# Patient Record
Sex: FEMALE | Race: WHITE | NOT HISPANIC OR LATINO | Employment: OTHER | URBAN - METROPOLITAN AREA
[De-identification: names, ages, dates, MRNs, and addresses within clinical notes are randomized per-mention and may not be internally consistent; named-entity substitution may affect disease eponyms.]

---

## 2019-11-11 ENCOUNTER — TRANSFERRED RECORDS (OUTPATIENT)
Dept: HEALTH INFORMATION MANAGEMENT | Facility: CLINIC | Age: 54
End: 2019-11-11

## 2020-02-19 ENCOUNTER — TRANSFERRED RECORDS (OUTPATIENT)
Dept: HEALTH INFORMATION MANAGEMENT | Facility: CLINIC | Age: 55
End: 2020-02-19

## 2020-08-05 ENCOUNTER — TRANSFERRED RECORDS (OUTPATIENT)
Dept: HEALTH INFORMATION MANAGEMENT | Facility: CLINIC | Age: 55
End: 2020-08-05

## 2020-09-08 ENCOUNTER — MEDICAL CORRESPONDENCE (OUTPATIENT)
Dept: HEALTH INFORMATION MANAGEMENT | Facility: CLINIC | Age: 55
End: 2020-09-08

## 2020-09-08 ENCOUNTER — TRANSFERRED RECORDS (OUTPATIENT)
Dept: HEALTH INFORMATION MANAGEMENT | Facility: CLINIC | Age: 55
End: 2020-09-08

## 2020-09-09 ENCOUNTER — TRANSFERRED RECORDS (OUTPATIENT)
Dept: HEALTH INFORMATION MANAGEMENT | Facility: CLINIC | Age: 55
End: 2020-09-09

## 2020-10-28 ENCOUNTER — TRANSFERRED RECORDS (OUTPATIENT)
Dept: HEALTH INFORMATION MANAGEMENT | Facility: CLINIC | Age: 55
End: 2020-10-28

## 2020-10-30 ENCOUNTER — MEDICAL CORRESPONDENCE (OUTPATIENT)
Dept: HEALTH INFORMATION MANAGEMENT | Facility: CLINIC | Age: 55
End: 2020-10-30

## 2020-10-30 ENCOUNTER — REFERRAL (OUTPATIENT)
Dept: TRANSPLANT | Facility: CLINIC | Age: 55
End: 2020-10-30

## 2020-10-30 NOTE — LETTER
November 16, 2020      Lizeth Goetz  [address] [facility] [address]         Dear Lizeth,       You have recently expressed interest in our Solid Organ Transplant Program and have requested to begin the evaluation process.  We have made several attempts to get in touch with you but have been unable to reach you.    If you are still interested and/or have any questions, please contact us at  [phone] or [phone]   Monday - Friday, between the hours of 8:30am and 5:00pm central time.    We look forward to hearing from you.      Regards,     Solid Organ Transplant Intake   [facility]'[address]  [address], [facility] [address]  [place] [address]  November 16, 2020

## 2022-06-23 ENCOUNTER — TRANSFERRED RECORDS (OUTPATIENT)
Dept: HEALTH INFORMATION MANAGEMENT | Facility: CLINIC | Age: 57
End: 2022-06-23

## 2022-06-23 ENCOUNTER — MEDICAL CORRESPONDENCE (OUTPATIENT)
Dept: HEALTH INFORMATION MANAGEMENT | Facility: CLINIC | Age: 57
End: 2022-06-23

## 2022-06-27 ENCOUNTER — REFERRAL (OUTPATIENT)
Dept: TRANSPLANT | Facility: CLINIC | Age: 57
End: 2022-06-27

## 2022-06-27 DIAGNOSIS — N18.6 ESRD (END STAGE RENAL DISEASE) (H): Primary | ICD-10-CM

## 2022-06-27 DIAGNOSIS — Z01.818 PRE-TRANSPLANT EVALUATION FOR KIDNEY TRANSPLANT: ICD-10-CM

## 2022-06-27 DIAGNOSIS — I25.10 CARDIOVASCULAR DISEASE: ICD-10-CM

## 2022-06-27 DIAGNOSIS — Q60.2 CONGENITAL RENAL AGENESIS AND DYSGENESIS: ICD-10-CM

## 2022-06-27 DIAGNOSIS — I10 ESSENTIAL HYPERTENSION: ICD-10-CM

## 2022-06-27 DIAGNOSIS — N18.5 CHRONIC KIDNEY DISEASE, STAGE V (H): ICD-10-CM

## 2022-06-27 DIAGNOSIS — Q60.5 CONGENITAL RENAL AGENESIS AND DYSGENESIS: ICD-10-CM

## 2022-06-27 NOTE — LETTER
Lizeth Goetz  [address] [facility] [address]                July 6, 2022                                                                                        MEDICAL RECORDS REQUEST    [facility] Kid[facility], Kidney Pancreas Transplant Program Records Request                      Facility: [facility]    Thank you for referring your patient to the [facility] Kidney, Kidney Pancreas Program, in order to process the referral we will need the following information;      1. 2728 form   2. Immunizations records  3. Providers Progress notes, (last 3 note)      Please call our office at [phone] if you have any questions or concerns.                Please fax all paper records to [phone] within 3-5 business days.      Thank you,   The SOT Referral Intake Team     [facility]  Solid Organ Transplant Office  [address] [place] [address], [address] [address]

## 2022-06-27 NOTE — LETTER
July 26, 2022      Lizeth Goetz  [address]  [facility] [address]      Dear Lizeth,    Thank you for your interest in the Transplant Center at [facility]. We look forward to being a part of your care team and assisting you through the transplant process.    As we discussed, your transplant coordinator is Danni Lester (Kidney).  You may call your coordinator at any time with questions or concerns.  [phone].    Please complete the following.    1. Fill out and return the enclosed forms    Authorization for Electronic Communication    Authorization to Discuss Protected Health Information    Authorization for Release of Protected Health Information    2. Sign up for:    FlatStackt, access to your electronic medical record (see enclosed pamphlet)    [email or web address] [place], a transplant education website    You can use these tools to learn more about your transplant, communicate with your care team, and track your medical details      Sincerely,      Solid Organ Transplant  [facility]    cc: Referring Physician PCP

## 2022-07-06 VITALS — BODY MASS INDEX: 16.24 KG/M2 | WEIGHT: 86 LBS | HEIGHT: 61 IN

## 2022-07-06 NOTE — TELEPHONE ENCOUNTER
PCP: Shane Saleem   Referring Provider: Rajat Archuleta  Referring Diagnosis: ESRD    Is patient under the age of 65? yes  Is patient diabetic? no  Is patient on insulin? no  Was patient offered a pancreas transplant referral? No    NOTES:  BMI 16.3    Is patient in a group home/assisted living? no  Does patient have a guardian? no    Referral intake process completed.  Patient is aware that after financial approval is received, medical records will be requested.   Patient confirmed for a callback from transplant coordinator on July 18, 2022. (within 2 weeks)  Tentative evaluation date August 8, 2022 (within 4 weeks) if appointment is virtual, does patient have capabilities of setting this up?     Confirmed coordinator will discuss evaluation process in more detail at the time of their call.   Patient is aware of the need to arrange age appropriate cancer screening, vaccinations, and dental care.  Reminded patient to complete questionnaire, complete medical records release, and review packet prior to evaluation visit .  Assessed patient for special needs (ie-wheelchair, assistance, guardian, and ):  none   Patient instructed to call [phone] with questions.     Patient gave verbal consent during intake call to obtain medical records and documents outside of [facility]:  yes

## 2022-07-17 ENCOUNTER — HEALTH MAINTENANCE LETTER (OUTPATIENT)
Age: 57
End: 2022-07-17

## 2022-07-19 NOTE — TELEPHONE ENCOUNTER
"Reviewed pt's chart for pre-kidney transplant evaluation planning. Pt lives in [facility]. Referred to our center by Dr. Archuleta. Pt has ESRD due to single kidney, hyperfiltration and/or hypertensive nephrosclerosis. Patient reports that she had a surgery on her ureter as a child in [place], but does not remember when. Biopsy not completed. Pt is on peritoneal dialysis. Pt is not diabetic. Other hx includes Hep C (treated), carotid artery stenosis, bipolar disorder.  Heart hx: hypertension.  Lung status: referred to pulmonary in 2019 for SOB but did not complete PFT\"s or schedule appointment. Surgical hx includes as above, tubal ligation, PD cath placement.  BMI 19 on 3/16/22.  Discussed BMI requirement for transplant with patient: yes. Needs a colonoscopy. Has dentures..  Last Pap 5/2019. NIL.  Mammogram: 8/30/21 negative.  Pt is a current everyday smoker, does not consume alcohol, and does not use recreational drugs. Pt is independent w/ ADLs and has support following transplant.     I also introduced [facility] and asked pt to create an account and view pre-kidney transplant videos for review with me following evaluation. Confirmed STD 8/8.   "

## 2022-08-01 ENCOUNTER — TELEPHONE (OUTPATIENT)
Dept: TRANSPLANT | Facility: CLINIC | Age: 57
End: 2022-08-01

## 2022-08-01 NOTE — TELEPHONE ENCOUNTER
Spoke with patient. Confirmed upcoming PKE appointments at [facility] on 8/8/22 starting at 0730. Instructed patient may eat breakfast, take regularly scheduled medications and be accompanied by 1 adult visitor.   Patient given contact information for any further questions/concerns/need to reschedule.

## 2022-08-04 NOTE — PROGRESS NOTES
[facility] Solid Organ Transplant  Outpatient MNT: Kidney Transplant Evaluation    Current BMI: 17.48 (HT 61 in, WT 92.5 lbs / 42 kg)  BMI is within recommendation of <35 for kidney transplant  -Discussed underweight BMI with pt, and discussed strategies for weight gain during our meeting. BMI of 18.5 for this pt would be ~98 lbs.    Frailty Assessment -- 0, not frail     Recommended Interventions to Address Frailty:  None at this time, but recommended pt pursue weight gain (pending surgeons discretion).      Time Spent: 15 minutes  Visit Type: Initial   Referring Physician: Felicia Bolaños PA-C  Pt accompanied by: Daughter    History of previous txp: none   Dialysis: yes    Dialysis Info: PD every night.   Protein supplement: takes Nepro, every other day.     Nutrition Assessment  -Pt tries to avoid high salt foods, but did not state she follows a low sodium diet necessarily.     - Appetite: chronically low appetite.   - Food allergies/intolerances: Eggs cause upset stomach.   - Living situation: Lives alone in an apartment.   - Meal prep & grocery shopping: Pt.   - Previous RD education: For dialysis, gets labs drawn once a month.   - Issues chewing or swallowing: No.   - N/V/D/C: No.   - Food access concerns: No.     Vitamins, Supplements, Pertinent Meds: MVI.  Herbal Medicines/Supplements: No.     Edema: No.     Weight hx: No CBW, but pt states the 86 lbs weight on file is UBW for her.     Diet Recall  Breakfast Coffee w/ cream and sugar and donut in AM    Lunch [place] (tuna or lunch meat) or soup (beef barley)   Dinner Pork chop and sometimes mashed potatoes. Pt states it is variable but usually a serving of meat and a starch.    Snacks Hummus and chivo bread.    Beverages Lots of water, sprite on occasion (pt states does not drink often)   Alcohol No.    Dining out Once or twice a month (sit down restaurants).      Physical Activity  Walking the dog every day for about 10-15 minutes.       Labs  -Creatinine 9.16 (baseline for pt).  -BUN 43 (baseline for pt).   -GFR 4    Nutrition Diagnosis  Underweight related to small portion sizes and hypocaloric energy intake as evidenced by BMI of 17.48 and diet recall.     Nutrition Intervention  Nutrition education provided:  Discussed sodium intake (low sodium foods and drinks, seasoning food without salt and tips for low sodium diet).  Discussed strategies for weight gain, including increasing Nepro intake to once a day vs current use of Nepro every other day, using extra sauces / [place] on foods, eating meals consistently and incorporating snacks between these meals, and eating increased portion sizes when able.     Reviewed post txp diet guidelines in brief (will review in further detail post txp):  (1) Review of proper food safety measures d/t immunosuppressant therapy post-op and increased risk for food-borne illness    (2) Avoid the following post txp d/t risk for rejection, unknown effects on the organs, and/or potential interactions with immunosuppressants:  - Herbal, Chinese, holistic, chiropractic, natural, alternative medicines and supplements  - Detoxes and cleanses  - Weight loss pills  - Protein powders or other products with extracts or herbs (ie green tea extract)    (3) Med regimen and possible side effects    Patient Understanding: Pt verbalized understanding of education provided.  Expected Engagement: Good  Follow-Up Plans: PRN     Nutrition Goals  -Discussed weight gain, recommend pt reaches BMI > 18.5 (~98 lbs).     Mynor Wells RD, LD

## 2022-08-08 ENCOUNTER — ALLIED HEALTH/NURSE VISIT (OUTPATIENT)
Dept: TRANSPLANT | Facility: CLINIC | Age: 57
End: 2022-08-08
Attending: PHYSICIAN ASSISTANT
Payer: COMMERCIAL

## 2022-08-08 ENCOUNTER — LAB (OUTPATIENT)
Dept: LAB | Facility: CLINIC | Age: 57
End: 2022-08-08

## 2022-08-08 ENCOUNTER — DOCUMENTATION ONLY (OUTPATIENT)
Dept: TRANSPLANT | Facility: CLINIC | Age: 57
End: 2022-08-08

## 2022-08-08 VITALS
HEIGHT: 61 IN | OXYGEN SATURATION: 99 % | HEART RATE: 92 BPM | SYSTOLIC BLOOD PRESSURE: 192 MMHG | WEIGHT: 92.5 LBS | BODY MASS INDEX: 17.47 KG/M2 | DIASTOLIC BLOOD PRESSURE: 92 MMHG

## 2022-08-08 DIAGNOSIS — I10 ESSENTIAL HYPERTENSION: ICD-10-CM

## 2022-08-08 DIAGNOSIS — Z01.818 PRE-TRANSPLANT EVALUATION FOR KIDNEY TRANSPLANT: ICD-10-CM

## 2022-08-08 DIAGNOSIS — B18.2 CHRONIC HEPATITIS C WITHOUT HEPATIC COMA (H): ICD-10-CM

## 2022-08-08 DIAGNOSIS — I25.10 CARDIOVASCULAR DISEASE: ICD-10-CM

## 2022-08-08 DIAGNOSIS — N18.5 CHRONIC KIDNEY DISEASE, STAGE V (H): ICD-10-CM

## 2022-08-08 DIAGNOSIS — Q60.5 CONGENITAL RENAL AGENESIS AND DYSGENESIS: ICD-10-CM

## 2022-08-08 DIAGNOSIS — N18.6 ESRD (END STAGE RENAL DISEASE) (H): Primary | ICD-10-CM

## 2022-08-08 DIAGNOSIS — Q60.2 CONGENITAL RENAL AGENESIS AND DYSGENESIS: ICD-10-CM

## 2022-08-08 DIAGNOSIS — I12.9 HYPERTENSION, RENAL: ICD-10-CM

## 2022-08-08 DIAGNOSIS — F14.11 COCAINE ABUSE IN REMISSION (H): ICD-10-CM

## 2022-08-08 DIAGNOSIS — I73.9 PAD (PERIPHERAL ARTERY DISEASE) (H): ICD-10-CM

## 2022-08-08 DIAGNOSIS — Z76.82 ORGAN TRANSPLANT CANDIDATE: ICD-10-CM

## 2022-08-08 DIAGNOSIS — N18.6 ESRD (END STAGE RENAL DISEASE) (H): ICD-10-CM

## 2022-08-08 LAB
A1 AB TITR SERPL: 32 {TITER}
A1 AB TITR SERPL: 8 {TITER}
A2 AB TITR SERPL: <1 {TITER}
A2 AB TITR SERPL: <1 {TITER}
ABO/RH(D): NORMAL
ALBUMIN SERPL-MCNC: 2.7 G/DL (ref 3.4–5)
ALBUMIN UR-MCNC: 100 MG/DL
ALP SERPL-CCNC: 67 U/L (ref 40–150)
ALT SERPL W P-5'-P-CCNC: 11 U/L (ref 0–50)
ANION GAP SERPL CALCULATED.3IONS-SCNC: 9 MMOL/L (ref 3–14)
ANTIBODY SCREEN: NEGATIVE
ANTIBODY TITER IGM SCREEN: NEGATIVE
APPEARANCE UR: CLEAR
APTT PPP: 25 SECONDS (ref 22–38)
AST SERPL W P-5'-P-CCNC: 14 U/L (ref 0–45)
BASOPHILS # BLD AUTO: 0.1 10E3/UL (ref 0–0.2)
BASOPHILS NFR BLD AUTO: 1 %
BILIRUB SERPL-MCNC: 0.3 MG/DL (ref 0.2–1.3)
BILIRUB UR QL STRIP: NEGATIVE
BUN SERPL-MCNC: 43 MG/DL (ref 7–30)
CALCIUM SERPL-MCNC: 8.7 MG/DL (ref 8.5–10.1)
CHLORIDE BLD-SCNC: 96 MMOL/L (ref 94–109)
CO2 SERPL-SCNC: 29 MMOL/L (ref 20–32)
COLOR UR AUTO: YELLOW
CREAT SERPL-MCNC: 9.46 MG/DL (ref 0.52–1.04)
EOSINOPHIL # BLD AUTO: 0.2 10E3/UL (ref 0–0.7)
EOSINOPHIL NFR BLD AUTO: 1 %
ERYTHROCYTE [DISTWIDTH] IN BLOOD BY AUTOMATED COUNT: 14.7 % (ref 10–15)
FACTOR 2 INTERPRETATION: NORMAL
FACTOR V INTERPRETATION: NORMAL
GFR SERPL CREATININE-BSD FRML MDRD: 4 ML/MIN/1.73M2
GLUCOSE BLD-MCNC: 100 MG/DL (ref 70–99)
GLUCOSE UR STRIP-MCNC: NEGATIVE MG/DL
HCT VFR BLD AUTO: 33.1 % (ref 35–47)
HGB BLD-MCNC: 10.9 G/DL (ref 11.7–15.7)
HGB UR QL STRIP: ABNORMAL
IMM GRANULOCYTES # BLD: 0 10E3/UL
IMM GRANULOCYTES NFR BLD: 0 %
INR PPP: 0.9 (ref 0.85–1.15)
KETONES UR STRIP-MCNC: NEGATIVE MG/DL
LAB DIRECTOR COMMENTS: NORMAL
LAB DIRECTOR DISCLAIMER: NORMAL
LAB DIRECTOR INTERPRETATION: NORMAL
LAB DIRECTOR METHODOLOGY: NORMAL
LAB DIRECTOR RESULTS: NORMAL
LEUKOCYTE ESTERASE UR QL STRIP: ABNORMAL
LYMPHOCYTES # BLD AUTO: 2.5 10E3/UL (ref 0.8–5.3)
LYMPHOCYTES NFR BLD AUTO: 23 %
MCH RBC QN AUTO: 28.4 PG (ref 26.5–33)
MCHC RBC AUTO-ENTMCNC: 32.9 G/DL (ref 31.5–36.5)
MCV RBC AUTO: 86 FL (ref 78–100)
MONOCYTES # BLD AUTO: 0.8 10E3/UL (ref 0–1.3)
MONOCYTES NFR BLD AUTO: 8 %
NEUTROPHILS # BLD AUTO: 7.5 10E3/UL (ref 1.6–8.3)
NEUTROPHILS NFR BLD AUTO: 67 %
NITRATE UR QL: NEGATIVE
NRBC # BLD AUTO: 0 10E3/UL
NRBC BLD AUTO-RTO: 0 /100
PH UR STRIP: 7.5 [PH] (ref 5–7)
PLATELET # BLD AUTO: 352 10E3/UL (ref 150–450)
POTASSIUM BLD-SCNC: 4 MMOL/L (ref 3.4–5.3)
PROT SERPL-MCNC: 6.8 G/DL (ref 6.8–8.8)
RBC # BLD AUTO: 3.84 10E6/UL (ref 3.8–5.2)
RBC URINE: 1 /HPF
SODIUM SERPL-SCNC: 134 MMOL/L (ref 133–144)
SP GR UR STRIP: 1.01 (ref 1–1.03)
SPECIMEN DESCRIPTION: NORMAL
SPECIMEN EXPIRATION DATE: NORMAL
SPECIMEN EXPIRATION DATE: NORMAL
SQUAMOUS EPITHELIAL: 9 /HPF
T PALLIDUM AB SER QL: NONREACTIVE
UROBILINOGEN UR STRIP-MCNC: NORMAL MG/DL
WBC # BLD AUTO: 11.1 10E3/UL (ref 4–11)
WBC URINE: 8 /HPF

## 2022-08-08 PROCEDURE — 86901 BLOOD TYPING SEROLOGIC RH(D): CPT

## 2022-08-08 PROCEDURE — 86706 HEP B SURFACE ANTIBODY: CPT

## 2022-08-08 PROCEDURE — 80053 COMPREHEN METABOLIC PANEL: CPT

## 2022-08-08 PROCEDURE — 86665 EPSTEIN-BARR CAPSID VCA: CPT

## 2022-08-08 PROCEDURE — 86481 TB AG RESPONSE T-CELL SUSP: CPT

## 2022-08-08 PROCEDURE — G0452 MOLECULAR PATHOLOGY INTERPR: HCPCS | Mod: 26 | Performed by: PATHOLOGY

## 2022-08-08 PROCEDURE — 85670 THROMBIN TIME PLASMA: CPT

## 2022-08-08 PROCEDURE — 86787 VARICELLA-ZOSTER ANTIBODY: CPT

## 2022-08-08 PROCEDURE — 86886 COOMBS TEST INDIRECT TITER: CPT

## 2022-08-08 PROCEDURE — 86147 CARDIOLIPIN ANTIBODY EA IG: CPT

## 2022-08-08 PROCEDURE — 86780 TREPONEMA PALLIDUM: CPT

## 2022-08-08 PROCEDURE — 80307 DRUG TEST PRSMV CHEM ANLYZR: CPT

## 2022-08-08 PROCEDURE — 81240 F2 GENE: CPT

## 2022-08-08 PROCEDURE — 81001 URINALYSIS AUTO W/SCOPE: CPT

## 2022-08-08 PROCEDURE — 85025 COMPLETE CBC W/AUTO DIFF WBC: CPT

## 2022-08-08 PROCEDURE — 86704 HEP B CORE ANTIBODY TOTAL: CPT

## 2022-08-08 PROCEDURE — 85610 PROTHROMBIN TIME: CPT

## 2022-08-08 PROCEDURE — 86644 CMV ANTIBODY: CPT

## 2022-08-08 PROCEDURE — 86803 HEPATITIS C AB TEST: CPT

## 2022-08-08 PROCEDURE — 85390 FIBRINOLYSINS SCREEN I&R: CPT | Mod: 26 | Performed by: PATHOLOGY

## 2022-08-08 PROCEDURE — 86833 HLA CLASS II HIGH DEFIN QUAL: CPT

## 2022-08-08 PROCEDURE — 85730 THROMBOPLASTIN TIME PARTIAL: CPT

## 2022-08-08 PROCEDURE — 86850 RBC ANTIBODY SCREEN: CPT

## 2022-08-08 PROCEDURE — 36415 COLL VENOUS BLD VENIPUNCTURE: CPT

## 2022-08-08 PROCEDURE — 99205 OFFICE O/P NEW HI 60 MIN: CPT

## 2022-08-08 PROCEDURE — 86832 HLA CLASS I HIGH DEFIN QUAL: CPT

## 2022-08-08 PROCEDURE — 81378 HLA I & II TYPING HR: CPT

## 2022-08-08 PROCEDURE — 99215 OFFICE O/P EST HI 40 MIN: CPT

## 2022-08-08 PROCEDURE — 99417 PROLNG OP E/M EACH 15 MIN: CPT

## 2022-08-08 PROCEDURE — 87340 HEPATITIS B SURFACE AG IA: CPT

## 2022-08-08 RX ORDER — ZOLPIDEM TARTRATE 5 MG/1
1 TABLET ORAL
COMMUNITY
Start: 2022-04-27

## 2022-08-08 RX ORDER — BISACODYL 5 MG/1
1 TABLET, COATED ORAL
COMMUNITY
Start: 2020-09-24

## 2022-08-08 RX ORDER — SEVELAMER CARBONATE 800 MG/1
1 TABLET, FILM COATED ORAL
COMMUNITY
Start: 2021-08-11

## 2022-08-08 RX ORDER — ASPIRIN 81 MG/1
1 TABLET, CHEWABLE ORAL
COMMUNITY
Start: 2021-07-29

## 2022-08-08 RX ORDER — BUSPIRONE HYDROCHLORIDE 15 MG/1
TABLET ORAL
COMMUNITY
Start: 2021-02-26

## 2022-08-08 RX ORDER — CLONIDINE HYDROCHLORIDE 0.1 MG/1
0.1 TABLET ORAL ONCE
Qty: 1 TABLET | Refills: 0 | Status: SHIPPED | OUTPATIENT
Start: 2022-08-08 | End: 2022-08-08

## 2022-08-08 RX ORDER — IPRATROPIUM BROMIDE AND ALBUTEROL SULFATE 2.5; .5 MG/3ML; MG/3ML
SOLUTION RESPIRATORY (INHALATION) EVERY 4 HOURS
COMMUNITY
Start: 2020-09-09

## 2022-08-08 RX ORDER — HYDRALAZINE HYDROCHLORIDE 25 MG/1
TABLET, FILM COATED ORAL
COMMUNITY
Start: 2021-04-22

## 2022-08-08 RX ORDER — ALBUTEROL SULFATE 90 UG/1
2 AEROSOL, METERED RESPIRATORY (INHALATION)
COMMUNITY
Start: 2020-09-09

## 2022-08-08 RX ORDER — LOSARTAN POTASSIUM 50 MG/1
TABLET ORAL
COMMUNITY
Start: 2021-07-29

## 2022-08-08 RX ORDER — CALCIUM ACETATE 667 MG/1
CAPSULE ORAL
COMMUNITY
Start: 2021-06-09

## 2022-08-08 RX ORDER — DILTIAZEM HYDROCHLORIDE 180 MG/1
CAPSULE, EXTENDED RELEASE ORAL
COMMUNITY
Start: 2020-09-09

## 2022-08-08 NOTE — PROGRESS NOTES
"Psychosocial Assessment  Patient Name/ Age: Lizeth Goetz 56 year old   Medical Record #: [id number]  Duration of Interview:     45   min  Process:   Face-to-Face Interview                (counseling < 50%)   Present at Appointment: Lizeth and her daughter Hollie        :MILLY Butt, [facility] Date:  August 8, 2022        Type of transplant: Kidney    Donor type:   Lizeth indicated her daughter and sister have expressed interest in being a donor.   [facility]   Prior Transplants:    No Status of Transplant:       Current Living Situation    Location:   [address] [place] [id number]  With Whom: lives alone       Family/ Social Support:    Hollie (37) lives in [place], [place].  Lizeth also has another daughter Gloria (33) who lives in [place] and a son Buck (39) who is incarcerated.  Lizeth has three sisters (2-[place], [place] and 1-[place], [place]).    Available, helpful   Committed relationship:   Single   Other supports:   Friends Available, helpful       Activities/ Functional Ability    Current level:  Lizeth wears corrective lenses. Ambulatory, visually impaired and independent with ADL's     Transportation Drives self       Vocational/Employment/Financial     Employment   Disabled   Job Description      Income  SSDI for 1.5 years due to COPD and ESRD.   [employer]   Insurance  [employer] [place]    At this time, patient can afford medication costs:  Yes  MA       Medical Status    Current mode of treatment for ESRD Dialysis   Complications - Non diabetic        Behavioral    Tobacco Use Yes  Chemical Dependency No   Lizeth indicated she is smoking half a pack of cigarettes a day and is working on quitting.   Lizeth indicated she quit drinking four years ago, no treatment required. She indicated she smokes marijuana \"couple of times\" a month to assist with appetite.     Psychiatric Impairment No  Lizeth indicated she is on anxiety medications daily which is of assistance.    Reading ability Good  Education Level: Some " College Recent Legal History No        Coping Style/Strategies: Lizeth indicated when under stress she will talk to her daughters or meditate.   Ability to Adhere to Complex Medical Regime: Yes     Adherence History:Lizeth indicated she will usually follow her physician's recommendations except for quitting smoking.        Education  _X_ Medicare  _X_ Rehabilitation  _X_ Donor issues  _X_ Community resources  _X_ Post discharge housing  _X_ Financial resources  _X_ Medical insurance options  _X_ Psych adjustment  _X_ Family adjustment  _X_ Health Care Directive - Yes, Will Provide   Psychosocial Risks of Transplant Reviewed and Discussed:  _X_ Increased stress related to emotional,            family, social, employment or financial           situation  _X_ Affect on work and/or disability benefits  _X_ Affect on future life insurance  _X_ Transplant outcome expectations may           not be met  _X_ Mental Health Risks: anxiety,           depression, PTSD, guilt, grief and           chronic fatigue     Notable Items:   In chart review it was noted Lizeth has a history of cocaine use, she denied any other drugs used beside marijuana.  Lizeth is not eligible for Medicare due to not having enough work credits.      Final Evaluation/Assessment   Patient seemed to process information well. Appeared well informed, motivated and able to follow post transplant requirements. Behavior was appropriate during interview. Has adequate income and insurance coverage. Adequate social support. No major contraindications noted for transplant.  At this time patient appears to understand the risks and benefits of transplant.      Recommendation  Acceptable    Selection Criteria Met:  Plan for support Yes   Chemical Dependence Yes   Smoking No  Mental Health Yes   Adequate Finances Yes    Signature: MILLY Butt, [facility]   Title: Clinical

## 2022-08-08 NOTE — PROGRESS NOTES
Kidney Transplant Referral - 6/27/2022  Patient attended appointments accompanied by daughter  Patient completed AM appointments with all PKE providers.  Time and location of PM appointments reviewed with patient.  Patient instructed next contact from Transplant Coordinator will be following Selection Committee  BP elevated.  Patient instructed to take regular dose Diltiazem  Repeat BP after 30 minutes and 45 minutes without significant change  Clonidine 10 mg ordered.  Patient did not return to clinic for BP recheck  KDPI form signed and faxed to HIM  Patient has not watched [facility] Pre Kidney Transplant videos 1&2.  Demonstrated accessing [facility] and watching Pre Kidney Transplant videos 1&2.      Summary    Team s concerns/comments:   1) Cardiac risk assessment  2) PAD assessment  3) COPD/tobacco use  4) Low BMI  5) Reflux nephrolapathy, Recurrent UTI  6) Poly substance abuse  7) Carotid stenosis  8) Anxiety/Depression  9) Labile blood pressure  10) HCV  11) Health maintenance    Candidacy category: Yellow    Action/Plan:   1) EKG today. Echocardiogram ordered  2) A/P CT, Iliac US, ABIs  3) Smoking cessation. PFTs. Low dose chest CT.  Pulmonary  4) BMI 17. Dietitian input  5) Urology consult  6) Urine drug screen ordered  7) Vascular surgery consult  8) SW input  9) F/U PCP  10) RNA pending  11) Due for colonoscopy, mammogram, Dental    Expected Selection Meeting Discussion: 8/17/22

## 2022-08-08 NOTE — LETTER
8/8/2022         RE: Lizeth Goetz  [address] [facility] [place] [address]        Dear Colleague,    Thank you for referring your patient, Lizeth Goetz, to the [facility]. Please see a copy of my visit note below.    TRANSPLANT NEPHROLOGY RECIPIENT EVALUATION NOTE    Assessment and Plan:  # Kidney Transplant Evaluation: Patient is a marginal candidate overall. Benefits of a living donor transplant were discussed.    # ESKD: likely multifactorial including congenital dsyplasia, reflux nephropathy, ALEKSEY, HTN and chronic hep c. Although doing OK on PD since 8/2020, she may benefit from a kidney transplant.     # Reflux nephropathy, Recurrent UTI:  Patient reports that she had a surgery on her ureter as a child with recurrent UTI most of her life, although has not been an  issue for past several years. Will need urology consult.     # HCV s/p treatment: patient reports completed treatment in late 2018 but was lost to follow up with GI. Has not had RNA checked post-treatment. Unremarkable fibroscan in 2018. Labs today included platelet count 353 K, INR and LFTS wnls, albumin 2.7. RNA pending. Will need hepatology clearance.     # H/o polysubstance use (cocaine, marijuana, alcohol): still smoking marijuana on a weekly basis. Urine drug screen pending.     # Tobacco Abuse, COPD: 30-pack-year. Still smoking 1/4 ppd. Will need low-dose CT chest and PFTs. Needs to quit smoking for transplant.     # Carotid disease: 70% stenosis on right and 50% stenosis on the left. Will await local vascular input.     # PAD screening: calcifications on outside 2020 CT. Will need updated CT with iliacs and ABIs per surgery.     # Cardiac Risk: Accelerated junctional rhythm on EKG, faxed to PCP and primary neph. Would need  coronary angiogram for cardiology clearance given vascular disease and smoking history.     # Labile blood pressures: SBP morning checks have been in the 70s as of late for which local neph d/c'd a few  antihypertensives. Presented with SBP >200 today. Took home afternoon dose of diltiazem. Clonidine also given. Will refer to local neph for management. Prior local work up has included undetectable renin levels and elevated aldosterone. Adrenal glands unremarkable on outside 2020 CT.     # Malnourished, BMI 17: albumin 2.7.     # Depression: poorly controlled. Possible bipolar mentioned in outside records. Appreciate social work input.      # Positive quant gold: needs ID.        # Health Maintenance: Colonoscopy: Not up to date, Mammogram: Not Up to date, 5/2010 PAP (NILM, HPV neg): Up to date and Dental: Not up to date    Discussed the risks and benefits of a transplant, including the risk of surgery and immunosuppression medications.  Patient's overall evaluation will be discussed in the Transplant Program's regular meeting with a final recommendation on the patients suitability for transplant to be made at that time.    Pending completion of the full evaluation, patient presently appears to be enough of an acceptable kidney transplant recipient candidate to have any potential kidney donors start the evaluation process.      Evaluation:  Lizeth Goetz was seen in consultation at the request of Dr. Marsha Griffith for evaluation as a potential kidney transplant recipient.    Reason for Visit:  Lizeth Goetz is a 56 year old female with ESKD liekly multifactorial, who presents for kidney transplant evaluation.    History of Present Illness:  Lizeth Goetz is a 56-year-old female with history of ESRD that is likely multifactorial including congenital dsyplasia, reflux nephropathy, ALEKSEY, HTN and chronic hep c.  No prior kidney biopsy. Patient reports that she had a surgery on her ureter as a child in [facility]. Recurrent UTIs most of her life. Diagnosed with HTN in 2015, since difficult to control.  Proteinuria since 2016 measuring 500 mg/dl on dip stick. CKD since 2018 with creatinines around 3.0. Had SUSHILA in 7/2020  with creatinine of 5.4 and which time she had a renal US that showed asymmetric kidneys. Ultimately started  PD in  8/2020 which she reports is going well.  No episodes of peritonitis.  When asked why she had a delay in coming in for transplant evaluation she states because she was unsure if she was a candidate and was not motivated to.           Kidney Disease Hx:        Kidney Disease Dx:  likely multifactorial including congenital dsyplasia, reflux nephropathy, ALEKSEY, HTN and chronic hep c.       Biopsy Proven: No         On Dialysis: Yes, Date initiated: 8/2020 and Dialysis Type: PD; still making some urine every day.        Primary Nephrologist: Dr. Archuleta       H/o Kidney Stones: No       H/o Recurrent/Frequent UTI: Yes          Diabetic Hx: None           Cardiac/Vascular Disease Risk Factors:        Cardiac Risk Factors: Hypertension, CKD, Peripheral Arterial Disease and Smoking       Known CAD: No       Known PAD/Caludication Symptoms: Yes; calcifications on 2020 CT       Known Heart Failure: No       Arrhythmia: No       Pulmonary Hypertension: No       Valvular Disease: No       Other: None         Viral Serology Status       CMV IgG Antibody: Unknown       EBV IgG Antibody: Unknown         Volume Status/Weight:        Volume status: Euvolemic       Weight:  BMI below guidelines       BMI: There is no height or weight on file to calculate BMI.         Functional Capacity/Frailty:        Lives in apartment alone. Walks her dog a few blocks a day with some dyspnea. Denies chest pains with stairs.     Fatigue/Decreased Energy: [x] No [] Yes    Chest Pain or SOB with Exertion: [] No [x] Yes Dyspnea with walking    Significant Weight Change: [x] No [] Yes    Nausea, Vomiting or Diarrhea: [x] No [] Yes    Fever, Sweats or Chills:  [x] No [] Yes    Leg Swelling [x] No [] Yes        History of Cancer: None    Other Significant Medical Issues:     # HTN: undetectable renin levels and elevated aldosterone.     # HCV  s/p treatment: patient reports completed treatment in late 2018 but was lost to follow up with GI. Had unremarkable fibroscan in 2018. Labs today included platelet count 353 K, INR and LFTS wnls, albumin 2.7    # H/o polysubstance use (cocaine, marijuana, alcohol): still smoking marijuana on a weekly basis.      # Depression: poorly controlled.     # Tobacco Abuse: 30-pack-year. Still smoking 1/4 ppd.     # Carotid disease: 70% stenosis on right and 50% stenosis on the left.      # Recurrent UTI: most of her life, but reports has not been an  issue for past several years.     # Labile blood pressures: SBP morning checks have been in the 70s for which local neph d/c'd a few  antihypertensives. Presented with SBP >200 today. Will refer to local neph for management.      # Malnourished, BMI 17: albumin 2.7.       Allergy Testing Questions:   Medication that caused a reaction None   Antibiotics used that didn't give an allergic reaction?  None    COVID Vaccination Up To Date: No    Potential Living Kidney Donors: sister, grand daughter, daughter    Review of Systems:  A comprehensive review of systems was obtained and negative, except as noted in the HPI or PMH.    Past Medical History:   Medical record was reviewed and PMH was discussed with patient and noted below.  Past Medical History:   Diagnosis Date     Hepatitis C 2019     Hypertension        Past Social History:   No past surgical history on file.  Personal history of bleeding or anesthesia problems: No    Family History:  No family history on file.    Personal History:   Social History     Socioeconomic History     Marital status: Single     Spouse name: Not on file     Number of children: Not on file     Years of education: Not on file     Highest education level: Not on file   Occupational History     Not on file   Tobacco Use     Smoking status: Current Every Day Smoker     Packs/day: 0.25     Types: Cigarettes     Smokeless tobacco: Never Used     Tobacco  "comment: \"in the process of quitting\"   Substance and Sexual Activity     Alcohol use: Not Currently     Drug use: Not Currently     Sexual activity: Not on file   Other Topics Concern     Not on file   Social History Narrative     Not on file     Social Determinants of Health     Financial Resource Strain: Not on file   Food Insecurity: Not on file   Transportation Needs: Not on file   Physical Activity: Not on file   Stress: Not on file   Social Connections: Not on file   Intimate Partner Violence: Not on file   Housing Stability: Not on file       Allergies:  Allergies   Allergen Reactions     Chicken-Derived Products (Egg) GI Disturbance     Codeine Nausea and Vomiting     Morphine Nausea and Vomiting       Medications:  Current Outpatient Medications   Medication Sig     albuterol (PROAIR HFA/PROVENTIL HFA/VENTOLIN HFA) 108 (90 Base) MCG/ACT inhaler Inhale 2 puffs into the lungs     aspirin (ASA) 81 MG chewable tablet Take 1 tablet by mouth     busPIRone (BUSPAR) 15 MG tablet      calcium acetate (PHOSLO) 667 MG CAPS capsule      diltiazem ER (DILT-XR) 180 MG 24 hr capsule      hydrALAZINE (APRESOLINE) 25 MG tablet      ipratropium - albuterol 0.5 mg/2.5 mg/3 mL (DUONEB) 0.5-2.5 (3) MG/3ML neb solution Inhale into the lungs every 4 hours     losartan (COZAAR) 50 MG tablet      Methoxy PEG-Epoetin Beta (MIRCERA IJ) Inject 50 mcg Subcutaneous     Multiple Vitamins-Minerals (PX COMPLETE SENIOR MULTIVITS) TABS Take 1 tablet by mouth     sevelamer carbonate (RENVELA) 800 MG tablet Take 1 tablet by mouth     zolpidem (AMBIEN) 5 MG tablet Take 1 tablet by mouth     No current facility-administered medications for this visit.       Vitals:  Breastfeeding No     Exam:  GENERAL APPEARANCE: alert and no distress  HENT: mouth without ulcers or lesions  LYMPHATICS: no cervical or supraclavicular nodes  RESP: lungs clear to auscultation - no rales, rhonchi or wheezes  CV: regular rhythm, normal rate, no rub, no " murmur  FEMORAL PULSES: palpated by surgery   EDEMA: no LE edema bilaterally  ABDOMEN: soft, nondistended, nontender, bowel sounds normal  MS: extremities normal - no gross deformities noted, no evidence of inflammation in joints, no muscle tenderness  SKIN: no rash    Results:   No results found for this or any previous visit (from the past 336 hour(s)).      Review of prior external note(s) from - [facility]where information from [facility] reviewed  I spent a total of 180 minutes on the day of the visit.   Time spent doing chart review, history and exam, documentation and further activities per the note        Again, thank you for allowing me to participate in the care of your patient.        Sincerely,        AMI

## 2022-08-08 NOTE — PROGRESS NOTES
Transplant Surgery Consult Note     Medical record number: [id number]  YOB: 1965,   Consult requested  for evaluation of kidney transplant candidacy.    Assessment and Recommendations:Ms. Goetz appears to be a fair candidate for kidney transplantation and has a good understanding of the risks and benefits of this approach to the management of renal failure. The following issues should be addressed prior to finalizing her transplant candidacy:     Transplant order: Ms. Goetz has End stage renal failure due to hypertension and single kidney whose condition is not expected to resolve, is expected to progress, and is expected to continue to develop related comorbid conditions.  Recommend he be considered as a candidate for kidney transplant.  Cardiology consult for cardiac risk stratification to be ordered: Yes. Likely will need cardiac cath due to history of smoking and calcifications seen on CT from 2019  CT abdomen and pelvis without contrast to be ordered for assessment of vascular targets: Yes  Transplant listing labs ordered to include HLA, ABOx2, Cr, etc.  Dietician consult ordered: Yes  Social work consult ordered: Yes  Imaging reports reviewed:  CT from 2/2020 showing atrophied of L kidney and no urolithiasis  Radiology images reviewed: B EIA and BRIDGET's suitable for transplant from CT from 2020 but there are some areas of plaque that may be flow limiting especially on the right side at the take off of the internal iliac artery. Duplex needed  Recipient suitable to move forward with work up of living donors:  No  Vascular surgery consult for 70% carotid artery stenosis  CT A/P non-con  Duplex of iliacs  Smoking cessation as a condition of participation due to her SOB, h/o COPD, and audible wheezing  PFT's due to SOB and long history of smoking.  Has been told she has COPD  Screening low dose chest CT due to history of smoking (smoking for 40 years)    The majority of our visit was spent in counselling,  discussing the medical and surgical risks of kidney transplantation. We discussed approximate wait time and how that is influenced by issues such as blood type and sensitization (PRA) and access to a living donor. I contrasted potential waiting time for living vs  donor kidneys from  normal (0-85%) or higher (%) kidney donor profile index (KDPI) donors and their associated outcomes. I would recommend this individual to consider kidneys from high KDPI donors. The reason for this decision is best summarized as: decreased dialysis related morbidity/mortality, accepting lower kidney graft survival rates. Potential surgical complications of kidney transplantation include bleeding, superficial or deep wound complications (infection, hernia, lymphocele), ureteral anastomotic failure (leak or stenosis), graft thrombosis, need for reoperation and other issues such as cardiac complications, pneumonia, deep venous thrombosis, pulmonary embolism, post transplant diabetes and death. The potential for recurrent disease or need for retransplantation was also addressed. We discussed the possible need for ureteral stent (and subsequent removal), and the utility of protocol biopsy and laboratory studies to evaluate for rejection or recurrent disease. We discussed the risk of graft rejection, our center's average graft and patient survival rates, immunosuppression protocols, as well as the potential opportunity to participate in clinical trials.  We also discussed the average length of stay, recovery process, and posttransplant lab and monitoring protocol.  I emphasized the need for strict immunosuppression medication adherence and the potential for complications of immunosuppression such as skin cancer or lymphoma, as well as a very low but not zero risk of donor-derived disease transmission risks (infection, cancer). Ms. Goetz asked good questions and her candidacy will be reviewed at our Multidisciplinary Selection  "Committee. Thank you for the opportunity to participate in Ms. Goetz's care.      Total time: 60 minutes        Marsha Griffith MD FACS  Assistant Professor of Surgery  Director, Living Kidney Donor Program.    ---------------------------------------------------------------------------------------------------    HPI: Ms. Goetz has End stage renal failure due to hypertension and single kidney. The patient is non-diabetic.       The patient is on dialysis.    Has potential kidney donors:  Yes .  Interested in participation in paired exchange if a donor is willing: Yes   Does not walk far due to SOB  Hepatitis C treated and has SVR  Still smoking    The patient has the following pertinent history:       No    Yes  Dialysis:    []      [x] via:    PD   Blood Transfusion                  [x]      []  Number of units:   Most recently:  Pregnancy:    []      [x] Number:  3     Previous Transplant:  [x]      [] Details:    Cancer    [x]      [] Comment:   Kidney stones   [x]      [] Comment:      Recurrent infections  [x]      []  Type:                  Bladder dysfunction  [x]      [] Cause:    Claudication   [x]      [] Distance:    Previous Amputation  [x]      [] Cause:     Chronic anticoagulation  [x]      [] Indication:   Mormonism  [x]      []   PSHx:  Ureteral surgery as a child  Lap PD catheter placement  Mastoid surgery as a child     Past Medical History:   Diagnosis Date     Hepatitis C 2019     Hypertension      No past surgical history on file.  No family history on file.  Social History     Socioeconomic History     Marital status: Single     Spouse name: Not on file     Number of children: Not on file     Years of education: Not on file     Highest education level: Not on file   Occupational History     Not on file   Tobacco Use     Smoking status: Current Every Day Smoker     Packs/day: 0.25     Types: Cigarettes     Smokeless tobacco: Never Used     Tobacco comment: \"in the process of " "quitting\"   Substance and Sexual Activity     Alcohol use: Not Currently     Drug use: Not Currently     Sexual activity: Not on file   Other Topics Concern     Not on file   Social History Narrative     Not on file     Social Determinants of Health     Financial Resource Strain: Not on file   Food Insecurity: Not on file   Transportation Needs: Not on file   Physical Activity: Not on file   Stress: Not on file   Social Connections: Not on file   Intimate Partner Violence: Not on file   Housing Stability: Not on file       ROS:   CONSTITUTIONAL:  No fevers or chills  EYES: negative for icterus  ENT:  negative for hearing loss, tinnitus and sore throat  RESPIRATORY:  negative for cough, sputum, dyspnea  CARDIOVASCULAR:  negative for chest pain Fatigue  GASTROINTESTINAL:  negative for nausea, vomiting, diarrhea or constipation  GENITOURINARY:  negative for incontinence, dysuria, bladder emptying problems  HEME:  No easy bruising  INTEGUMENT:  negative for rash and pruritus  NEURO:  Negative for headache, seizure disorder  Allergies:   Allergies   Allergen Reactions     Chicken-Derived Products (Egg) GI Disturbance     Codeine Nausea and Vomiting     Morphine Nausea and Vomiting     Medications:  Prescription Medications as of 8/8/2022       Rx Number Disp Refills Start End Last Dispensed Date Next Fill Date Owning Pharmacy    albuterol (PROAIR HFA/PROVENTIL HFA/VENTOLIN HFA) 108 (90 Base) MCG/ACT inhaler    9/9/2020        Sig: Inhale 2 puffs into the lungs    Class: Historical    Route: Inhalation    aspirin (ASA) 81 MG chewable tablet    7/29/2021        Sig: Take 1 tablet by mouth    Class: Historical    Route: Oral    busPIRone (BUSPAR) 15 MG tablet    2/26/2021        Class: Historical    Route: Oral    calcium acetate (PHOSLO) 667 MG CAPS capsule    6/9/2021        Class: Historical    diltiazem ER (DILT-XR) 180 MG 24 hr capsule    9/9/2020        Class: Historical    Route: Oral    hydrALAZINE (APRESOLINE) 25 " MG tablet    4/22/2021        Class: Historical    ipratropium - albuterol 0.5 mg/2.5 mg/3 mL (DUONEB) 0.5-2.5 (3) MG/3ML neb solution    9/9/2020        Sig: Inhale into the lungs every 4 hours    Class: Historical    Route: Inhalation    losartan (COZAAR) 50 MG tablet    7/29/2021        Class: Historical    Methoxy PEG-Epoetin Beta (MIRCERA IJ)    7/25/2022        Sig: Inject 50 mcg Subcutaneous    Class: Historical    Route: Subcutaneous    Multiple Vitamins-Minerals (PX COMPLETE SENIOR MULTIVITS) TABS    9/24/2020        Sig: Take 1 tablet by mouth    Class: Historical    Route: Oral    sevelamer carbonate (RENVELA) 800 MG tablet    8/11/2021        Sig: Take 1 tablet by mouth    Class: Historical    Route: Oral    zolpidem (AMBIEN) 5 MG tablet    4/27/2022        Sig: Take 1 tablet by mouth    Class: Historical    Route: Oral        Exam:     There were no vitals taken for this visit.  Appearance: in no apparent distress. Cachetic   Skin: normal  Eyes:  no redness or discharge.  Sclera anicteric  Head and Neck: Normal, no rashes or jaundice  Respiratory: easy respirations, no audible wheezing.  Abdomen: flat, No distention and Surgical scars consistent with history   Extremeties: femoral 3+/3+, Edema, none  Neuro: without deficit   Psychiatric: Normal mood and affect    Diagnostics:   No results found for this or any previous visit (from the past 672 hour(s)).  No results found for: CPRA

## 2022-08-08 NOTE — PROGRESS NOTES
TRANSPLANT NEPHROLOGY RECIPIENT EVALUATION NOTE    Assessment and Plan:  # Kidney Transplant Evaluation: Patient is a marginal candidate overall. Benefits of a living donor transplant were discussed.    # ESKD: likely multifactorial including congenital dsyplasia, reflux nephropathy, ALEKSEY, HTN and chronic hep c. Although doing OK on PD since 8/2020, she may benefit from a kidney transplant.     # Reflux nephropathy, Recurrent UTI:  Patient reports that she had a surgery on her ureter as a child with recurrent UTI most of her life, although has not been an  issue for past several years. Will need urology consult.     # HCV s/p treatment: patient reports completed treatment in late 2018 but was lost to follow up with GI. Has not had RNA checked post-treatment. Unremarkable fibroscan in 2018. Labs today included platelet count 353 K, INR and LFTS wnls, albumin 2.7. RNA pending. Will need hepatology clearance.     # H/o polysubstance use (cocaine, marijuana, alcohol): still smoking marijuana on a weekly basis. Urine drug screen pending.     # Tobacco Abuse, COPD: 30-pack-year. Still smoking 1/4 ppd. Will need low-dose CT chest and PFTs. Needs to quit smoking for transplant.     # Carotid disease: 70% stenosis on right and 50% stenosis on the left. Will await local vascular input.     # PAD screening: calcifications on outside 2020 CT. Will need updated CT with iliacs and ABIs per surgery.     # Cardiac Risk: Accelerated junctional rhythm on EKG, faxed to PCP and primary neph. Would need  coronary angiogram for cardiology clearance given vascular disease and smoking history.     # Labile blood pressures: SBP morning checks have been in the 70s as of late for which local neph d/c'd a few antihypertensives. Presented with SBP >200 today. Took home afternoon dose of diltiazem. Clonidine also given. Will refer to local neph for management. Prior local work up has included undetectable renin levels and elevated aldosterone.  Adrenal glands unremarkable on outside 2020 CT.     # Malnourished, BMI 17: albumin 2.7.     # Depression: poorly controlled. Possible bipolar mentioned in outside records. Appreciate social work input.      # Positive quant gold: needs ID.        # Health Maintenance: Colonoscopy: Not up to date, Mammogram: Not Up to date, 5/2010 PAP (NILM, HPV neg): Up to date and Dental: Not up to date    Discussed the risks and benefits of a transplant, including the risk of surgery and immunosuppression medications.  Patient's overall evaluation will be discussed in the Transplant Program's regular meeting with a final recommendation on the patients suitability for transplant to be made at that time.    Pending completion of the full evaluation, patient presently appears to be enough of an acceptable kidney transplant recipient candidate to have any potential kidney donors start the evaluation process.      Evaluation:  Lizeth Goetz was seen in consultation at the request of Dr. Marsha Griffith for evaluation as a potential kidney transplant recipient.    Reason for Visit:  Lizeth Goetz is a 56 year old female with ESKD liekly multifactorial, who presents for kidney transplant evaluation.    History of Present Illness:  Lizeth Goetz is a 56-year-old female with history of ESRD that is likely multifactorial including congenital dsyplasia, reflux nephropathy, ALEKSEY, HTN and chronic hep c.  No prior kidney biopsy. Patient reports that she had a surgery on her ureter as a child in [facility]. Recurrent UTIs most of her life. Diagnosed with HTN in 2015, since difficult to control.  Proteinuria since 2016 measuring 500 mg/dl on dip stick. CKD since 2018 with creatinines around 3.0. Had SUSHILA in 7/2020 with creatinine of 5.4 and which time she had a renal US that showed asymmetric kidneys. Ultimately started  PD in  8/2020 which she reports is going well.  No episodes of peritonitis.  When asked why she had a delay in coming in for  transplant evaluation she states because she was unsure if she was a candidate and was not motivated to.           Kidney Disease Hx:        Kidney Disease Dx:  likely multifactorial including congenital dsyplasia, reflux nephropathy, ALEKSEY, HTN and chronic hep c.       Biopsy Proven: No         On Dialysis: Yes, Date initiated: 8/2020 and Dialysis Type: PD; still making some urine every day.        Primary Nephrologist: Dr. Archuleta       H/o Kidney Stones: No       H/o Recurrent/Frequent UTI: Yes          Diabetic Hx: None           Cardiac/Vascular Disease Risk Factors:        Cardiac Risk Factors: Hypertension, CKD, Peripheral Arterial Disease and Smoking       Known CAD: No       Known PAD/Caludication Symptoms: Yes; calcifications on 2020 CT       Known Heart Failure: No       Arrhythmia: No       Pulmonary Hypertension: No       Valvular Disease: No       Other: None         Viral Serology Status       CMV IgG Antibody: Unknown       EBV IgG Antibody: Unknown         Volume Status/Weight:        Volume status: Euvolemic       Weight:  BMI below guidelines       BMI: There is no height or weight on file to calculate BMI.         Functional Capacity/Frailty:        Lives in apartment alone. Walks her dog a few blocks a day with some dyspnea. Denies chest pains with stairs.     Fatigue/Decreased Energy: [x] No [] Yes    Chest Pain or SOB with Exertion: [] No [x] Yes Dyspnea with walking    Significant Weight Change: [x] No [] Yes    Nausea, Vomiting or Diarrhea: [x] No [] Yes    Fever, Sweats or Chills:  [x] No [] Yes    Leg Swelling [x] No [] Yes        History of Cancer: None    Other Significant Medical Issues:     # HTN: undetectable renin levels and elevated aldosterone.     # HCV s/p treatment: patient reports completed treatment in late 2018 but was lost to follow up with GI. Had unremarkable fibroscan in 2018. Labs today included platelet count 353 K, INR and LFTS wnls, albumin 2.7    # H/o polysubstance  "use (cocaine, marijuana, alcohol): still smoking marijuana on a weekly basis.      # Depression: poorly controlled.     # Tobacco Abuse: 30-pack-year. Still smoking 1/4 ppd.     # Carotid disease: 70% stenosis on right and 50% stenosis on the left.      # Recurrent UTI: most of her life, but reports has not been an  issue for past several years.     # Labile blood pressures: SBP morning checks have been in the 70s for which local neph d/c'd a few  antihypertensives. Presented with SBP >200 today. Will refer to local neph for management.      # Malnourished, BMI 17: albumin 2.7.       Allergy Testing Questions:   Medication that caused a reaction None   Antibiotics used that didn't give an allergic reaction?  None    COVID Vaccination Up To Date: No    Potential Living Kidney Donors: sister, grand daughter, daughter    Review of Systems:  A comprehensive review of systems was obtained and negative, except as noted in the HPI or PMH.    Past Medical History:   Medical record was reviewed and PMH was discussed with patient and noted below.  Past Medical History:   Diagnosis Date     Hepatitis C 2019     Hypertension        Past Social History:   No past surgical history on file.  Personal history of bleeding or anesthesia problems: No    Family History:  No family history on file.    Personal History:   Social History     Socioeconomic History     Marital status: Single     Spouse name: Not on file     Number of children: Not on file     Years of education: Not on file     Highest education level: Not on file   Occupational History     Not on file   Tobacco Use     Smoking status: Current Every Day Smoker     Packs/day: 0.25     Types: Cigarettes     Smokeless tobacco: Never Used     Tobacco comment: \"in the process of quitting\"   Substance and Sexual Activity     Alcohol use: Not Currently     Drug use: Not Currently     Sexual activity: Not on file   Other Topics Concern     Not on file   Social History Narrative     " Not on file     Social Determinants of Health     Financial Resource Strain: Not on file   Food Insecurity: Not on file   Transportation Needs: Not on file   Physical Activity: Not on file   Stress: Not on file   Social Connections: Not on file   Intimate Partner Violence: Not on file   Housing Stability: Not on file       Allergies:  Allergies   Allergen Reactions     Chicken-Derived Products (Egg) GI Disturbance     Codeine Nausea and Vomiting     Morphine Nausea and Vomiting       Medications:  Current Outpatient Medications   Medication Sig     albuterol (PROAIR HFA/PROVENTIL HFA/VENTOLIN HFA) 108 (90 Base) MCG/ACT inhaler Inhale 2 puffs into the lungs     aspirin (ASA) 81 MG chewable tablet Take 1 tablet by mouth     busPIRone (BUSPAR) 15 MG tablet      calcium acetate (PHOSLO) 667 MG CAPS capsule      diltiazem ER (DILT-XR) 180 MG 24 hr capsule      hydrALAZINE (APRESOLINE) 25 MG tablet      ipratropium - albuterol 0.5 mg/2.5 mg/3 mL (DUONEB) 0.5-2.5 (3) MG/3ML neb solution Inhale into the lungs every 4 hours     losartan (COZAAR) 50 MG tablet      Methoxy PEG-Epoetin Beta (MIRCERA IJ) Inject 50 mcg Subcutaneous     Multiple Vitamins-Minerals (PX COMPLETE SENIOR MULTIVITS) TABS Take 1 tablet by mouth     sevelamer carbonate (RENVELA) 800 MG tablet Take 1 tablet by mouth     zolpidem (AMBIEN) 5 MG tablet Take 1 tablet by mouth     No current facility-administered medications for this visit.       Vitals:  Breastfeeding No     Exam:  GENERAL APPEARANCE: alert and no distress  HENT: mouth without ulcers or lesions  LYMPHATICS: no cervical or supraclavicular nodes  RESP: lungs clear to auscultation - no rales, rhonchi or wheezes  CV: regular rhythm, normal rate, no rub, no murmur  FEMORAL PULSES: palpated by surgery   EDEMA: no LE edema bilaterally  ABDOMEN: soft, nondistended, nontender, bowel sounds normal  MS: extremities normal - no gross deformities noted, no evidence of inflammation in joints, no muscle  tenderness  SKIN: no rash    Results:   No results found for this or any previous visit (from the past 336 hour(s)).      Review of prior external note(s) from - [facility]where information from [facility] reviewed  I spent a total of 180 minutes on the day of the visit.   Time spent doing chart review, history and exam, documentation and further activities per the note

## 2022-08-09 PROBLEM — I77.9 CAROTID DISEASE, BILATERAL (H): Status: ACTIVE | Noted: 2022-08-09

## 2022-08-09 PROBLEM — N13.729 REFLUX NEPHROPATHY: Status: ACTIVE | Noted: 2022-08-09

## 2022-08-09 PROBLEM — Q61.4 CONGENITAL RENAL DYSPLASIA: Status: ACTIVE | Noted: 2022-08-09

## 2022-08-09 PROBLEM — Z99.2 ESRD (END STAGE RENAL DISEASE) ON DIALYSIS (H): Status: ACTIVE | Noted: 2022-08-09

## 2022-08-09 PROBLEM — I70.1 RENAL ARTERY STENOSIS (H): Status: ACTIVE | Noted: 2022-08-09

## 2022-08-09 PROBLEM — E46 MALNUTRITION (H): Status: ACTIVE | Noted: 2022-08-09

## 2022-08-09 PROBLEM — I12.9 HYPERTENSION, RENAL: Status: ACTIVE | Noted: 2022-08-09

## 2022-08-09 PROBLEM — N18.6 ESRD (END STAGE RENAL DISEASE) ON DIALYSIS (H): Status: ACTIVE | Noted: 2022-08-09

## 2022-08-09 PROBLEM — F32.A DEPRESSION: Status: ACTIVE | Noted: 2022-08-09

## 2022-08-09 PROBLEM — B18.2 HEPATITIS C, CHRONIC (H): Status: ACTIVE | Noted: 2022-08-09

## 2022-08-09 PROBLEM — Z72.0 TOBACCO ABUSE: Status: ACTIVE | Noted: 2022-08-09

## 2022-08-09 LAB
AMPHETAMINES UR QL SCN: NORMAL
ATRIAL RATE - MUSE: 79 BPM
BARBITURATES UR QL SCN: NORMAL
BENZODIAZ UR QL SCN: NORMAL
BZE UR QL SCN: NORMAL
CANNABINOIDS UR QL SCN: NORMAL
CARDIOLIPIN IGG SER IA-ACNC: <2 GPL-U/ML
CARDIOLIPIN IGG SER IA-ACNC: NEGATIVE
CARDIOLIPIN IGM SER IA-ACNC: 2.7 MPL-U/ML
CARDIOLIPIN IGM SER IA-ACNC: NEGATIVE
CMV IGG SERPL IA-ACNC: >10 U/ML
CMV IGG SERPL IA-ACNC: ABNORMAL
DIASTOLIC BLOOD PRESSURE - MUSE: NORMAL MMHG
DRVVT SCREEN RATIO: 0.92
EBV VCA IGG SER IA-ACNC: 309 U/ML
EBV VCA IGG SER IA-ACNC: POSITIVE
ETHANOL UR QL SCN: NORMAL
HBV CORE AB SERPL QL IA: NONREACTIVE
HBV SURFACE AB SERPL IA-ACNC: 156.14 M[IU]/ML
HBV SURFACE AG SERPL QL IA: NONREACTIVE
HCV AB SERPL QL IA: REACTIVE
HIV 1+2 AB+HIV1 P24 AG SERPL QL IA: NONREACTIVE
INTERPRETATION ECG - MUSE: NORMAL
LA PPP-IMP: NEGATIVE
LUPUS INTERPRETATION: NORMAL
OPIATES UR QL SCN: NORMAL
P AXIS - MUSE: NORMAL DEGREES
PCP QUAL URINE (ROCHE): NORMAL
PR INTERVAL - MUSE: NORMAL MS
PTT RATIO: 0.95
QRS DURATION - MUSE: 64 MS
QT - MUSE: 400 MS
QTC - MUSE: 461 MS
R AXIS - MUSE: 86 DEGREES
SYSTOLIC BLOOD PRESSURE - MUSE: NORMAL MMHG
T AXIS - MUSE: 74 DEGREES
THROMBIN TIME: 16.3 SECONDS (ref 13–19)
VENTRICULAR RATE- MUSE: 80 BPM
VZV IGG SER QL IA: 1570 INDEX
VZV IGG SER QL IA: POSITIVE

## 2022-08-10 LAB
GAMMA INTERFERON BACKGROUND BLD IA-ACNC: 0.04 IU/ML
M TB IFN-G BLD-IMP: POSITIVE
M TB IFN-G CD4+ BCKGRND COR BLD-ACNC: 9.96 IU/ML
MITOGEN IGNF BCKGRD COR BLD-ACNC: 0.5 IU/ML
MITOGEN IGNF BCKGRD COR BLD-ACNC: 8.68 IU/ML
QUANTIFERON MITOGEN: 10 IU/ML
QUANTIFERON NIL TUBE: 0.04 IU/ML
QUANTIFERON TB1 TUBE: 0.54 IU/ML
QUANTIFERON TB2 TUBE: 8.72

## 2022-08-11 LAB
A*: NORMAL
A*LOCUS SEROLOGIC EQUIVALENT: 1
A*LOCUS: NORMAL
A*SEROLOGIC EQUIVALENT: 23
ABTEST METHOD: NORMAL
B*: NORMAL
B*LOCUS SEROLOGIC EQUIVALENT: 8
B*LOCUS: NORMAL
B*SEROLOGIC EQUIVALENT: 65
BW-1: NORMAL
C*: NORMAL
C*LOCUS SEROLOGIC EQUIVALENT: 7
C*LOCUS: NORMAL
C*SEROLOGIC EQUIVALENT: 8
DPA1*: NORMAL
DPB1*: NORMAL
DPB1*LOCUS NMDP: NORMAL
DPB1*LOCUS: NORMAL
DPB1*NMDP: NORMAL
DQA1*: NORMAL
DQA1*LOCUS: NORMAL
DQB1*: NORMAL
DQB1*LOCUS SEROLOGIC EQUIVALENT: 7
DQB1*LOCUS: NORMAL
DQB1*SEROLOGIC EQUIVALENT: 6
DRB1*: NORMAL
DRB1*LOCUS SEROLOGIC EQUIVALENT: 13
DRB1*LOCUS: NORMAL
DRB1*SEROLOGIC EQUIVALENT: 15
DRB3*LOCUS SEROLOGIC EQUIVALENT: 52
DRB3*LOCUS: NORMAL
DRB5*: NORMAL
DRB5*SEROLOGIC EQUIVALENT: 51
DRSSO TEST METHOD: NORMAL

## 2022-08-12 LAB
PROTOCOL CUTOFF: NORMAL
SA 1 CELL: NORMAL
SA 1 TEST METHOD: NORMAL
SA 2 CELL: NORMAL
SA 2 TEST METHOD: NORMAL
SA1 HI RISK ABY: NORMAL
SA1 MOD RISK ABY: NORMAL
SA2 HI RISK ABY: NORMAL
SA2 MOD RISK ABY: NORMAL
UNACCEPTABLE ANTIGENS: NORMAL
UNOS CPRA: 44
ZZZSA 1  COMMENTS: NORMAL
ZZZSA 2 COMMENTS: NORMAL

## 2022-08-17 ENCOUNTER — COMMITTEE REVIEW (OUTPATIENT)
Dept: TRANSPLANT | Facility: CLINIC | Age: 57
End: 2022-08-17

## 2022-08-17 NOTE — COMMITTEE REVIEW
Abdominal Committee Review Note     Evaluation Date: 2022  Committee Review Date: 2022    Organ being evaluated for: Kidney    Transplant Phase: Evaluation  Transplant Status: Active    Transplant Coordinator: Danni Lester  Transplant Surgeon:       Referring Physician: Rajat Archuleta    Primary Diagnosis:   Secondary Diagnosis:     Committee Review Members:  Ginger, Swetha Lanier RD   Nephrology Edi Lynn MD, Shane Aparicio, APRN CNP, Lucian Rico MD, Kev Fairbanks MD   Pharmacist Ewelina Caba, [facility]    - Clinical Dang Val Benites, [facility]   Transplant Felicia Sandoval PA-C, Maris Brown, RN, Leesa Dong, RN, Shy Arora, RN, Octaviano Rice, [place], Danni Lester, RN, Hollie Del Valle RN   Transplant Surgery Isael Vallejo MD, Marsha Griffith MD, MD       Transplant Eligibility: Irreversible chronic kidney disease treated w/dialysis or expected need for dialysis    Committee Review Decision: Needs Re-presentation    Relative Contraindications: Other, COPD, smoking    Absolute Contraindications:     Committee Chair Marsha Griffith MD verbally attested to the committee's decision.    Committee Discussion Details: Reviewed pt's medical status and evaluation results to date.  Patient is determined to be a a marginal candidate for kidney transplant. Dr. Griffith, along with nephrology NEHEMIAS, Shane Aparicio, recommends the following items be completed as part of the pt's evaluation:   1. Because of patient's history of smoking and note of calcifications on previous abdominal imaging, patient should first complete a Non-contrast abdominal/pelvic CT, iliac US, and PANKAJ's to determine surgical suitability    If vasculature is deemed appropriate for transplant, she should move forward with the followin. Urology consult for history of UTI's  3. Follow up with [facility] after Hep C treatment  4. Infectious disease consult due to  positive quant gold in evaluation  5. Smoking cessation, as well as PFT's and low dose chest CT scan for lung cancer screening  6. Update health maintenance with colonoscopy, mammogram, dental work.  7. Better control of blood pressures with primary nephrology.  8. Weight gain as her BMI is 17 and she is malnourished with an albumin of 2.7.  9. Cardiology consult with an angiogram due to smoking history    Will not be listed at this time as they are on dialysis. They will be rediscussed once the above are complete for consideration of active status. If imaging is not suitable for transplant, she will be declined as a candidate for kidney transplant.

## 2022-08-18 LAB
ABO/RH(D): NORMAL
SPECIMEN EXPIRATION DATE: NORMAL

## 2022-08-23 ENCOUNTER — TELEPHONE (OUTPATIENT)
Dept: TRANSPLANT | Facility: CLINIC | Age: 57
End: 2022-08-23

## 2022-08-23 NOTE — LETTER
PHYSICIAN ORDERS      DATE & TIME ISSUED: 2022 4:39 PM  PATIENT NAME: Lizeth Goetz   : 1965     [facility] MR# [if applicable]: [id number]     DIAGNOSIS:  pre-transplant evaluation for kidney transplant  ICD-10 CODE: Z01.818     Please complete the following as a part of patient's pre-kidney transplant evaluation:  1. Aorta/IVC/Iliac US  2. Segmental pressure study (with PANKAJ's)    Patient is aware that these tests are scheduled out until November and would like to schedule them anyway. Thank you!    Any questions please call: Danni Lester [phone]. Please fax results to:    [phone].      Marsha Griffith MD FACS  Assistant Professor of Surgery  Director, Living Kidney Donor Program.

## 2022-08-23 NOTE — TELEPHONE ENCOUNTER
Contacted patient to review outcome of selection committee meeting (See selection committee encounter).   Explained to patient that he/she needs to complete all components of the evaluation to be eligible for active status on the waiting list or to proceed with a live donor kidney transplant.   Reviewed next steps based on outcomes:   Patient will not be listed (patient is on dialysis and evaluation is not complete), patient will receive:    - An Evaluation Summary Letter indicating what is needed to complete evaluation-discussed with patient if they would like to have testing done with [facility] or locally  Confirmed with patient that on successful completion of outstanding components, patient is eligible for active status and they will receive a follow-up call.   Confirmed that patient has contact information for additional questions or concerns.     Spoke with Lizeth about her evaluation, and the concerns that the transplant team has regarding her candidacy. We first discussed the vascular issues that are present on previous CT scans and how this could rule her out as a transplant candidate. She has a Non-contrast abdominal/pelvic CT scheduled next week at [facility] in [place]. We discussed the other imaging testing requested and she would like to do that closer to home as well. Writer spoke with [facility] imaging department and they do not complete vascular ultrasounds. The vascular center in [place] can complete the iliac US and PANKAJ's as a segmental pressure study but they are booked out into November. Message sent to Alessandra to discuss augmenting order so this can be completed in [place] for patient ease of access.     We also discussed the remainder of her evaluation if she is still a candidate after her imaging is reviewed:  Urology consult  Cardiology with angiogram  Lung cancer screening  Smoking cessation  Infectious disease for positive quant gold  [facility] return visit for Hep C clearance-- patient  reports that she completed this. Writer will contact [facility] for records  Health maintenance updates    We discussed her BMI and albumin level and how this could predict malnourishment. We discussed her BP's and she reports that these have always been labile, but understands that they need to be controlled prior to transplant.

## 2022-08-23 NOTE — LETTER
08/23/22        Lizeth Goetz  [address] [address]        Dear Lizeth,    It was a pleasure to see you recently for consideration of kidney transplantation. Your pre-transplant evaluation results were reviewed at our Multidisciplinary Selection Committee. The committee is requesting the following items are completed before determining your candidacy:    1. Non-contrast abdominal/pelvic CT scan  2. Aorto/iliac ultrasound  3. PANKAJ Ultrasound (Ankle-Brachial Index)    These tests assess the suitability of your blood vessels and blood flow to determine your transplant candidacy. This imaging will be reviewed by our transplant surgery team. If you are approved to move forward after these are reviewed, it is requested that you complete the following as a part of your evaluation:    4. Urology consult for work up of your urinary tract infections  5. Cardiology consult to assess your heart function. Because you have a history of smoking, the transplant team requests that you undergo a coronary angiogram.  6. Infectious disease consult to discuss the positive TB test in evaluation  7. Lung cancer screening: low-dose chest CT and Pulmonary Function Tests (PFT's). It is a requirement that you stop smoking to be a candidate for transplant.  8. Transplant team to review records from HCV treatment and [facility].  9. Update your health maintenance items: colonoscopy, mammogram, dental cleaning  10. Work on optimizing your blood pressures with your primary nephrologist. Focus on getting enough nutrition, as your BMI indicates that you are underweight, and your lab work also suggests that you aren't getting adequate nutrition.    If you wish to schedule any appointments at our clinic, please let your transplant coordinator know who will assist with scheduling.     Please notify your transplant coordinator when all of the above items are successfully completed, at this point your results will be reviewed at the Multidisciplinary  Selection Committee for approval. If your results indicate that your health is adequate for transplant, you will be eligible to be changed to ACTIVE status on the wait list and also to proceed with a live kidney donor transplant in the event of an approved live donor.        For any questions, please contact the Transplant Office at [phone].      Sincerely,    Danni Lester RN BSN CCTN  Solid Organ Transplant  [facility], [facility]

## 2022-09-15 ENCOUNTER — TELEPHONE (OUTPATIENT)
Dept: TRANSPLANT | Facility: CLINIC | Age: 57
End: 2022-09-15

## 2022-09-15 NOTE — TELEPHONE ENCOUNTER
Followed up with Lzieth on her appointments. She had a CT of her head that showed blocked carotid arteries, she is following with vascular surgery through [facility]. She has the imaging ordered by transplant scheduled in November through [facility]. We will connect after these are completed.

## 2022-09-24 ENCOUNTER — HEALTH MAINTENANCE LETTER (OUTPATIENT)
Age: 57
End: 2022-09-24

## 2023-05-08 ENCOUNTER — HEALTH MAINTENANCE LETTER (OUTPATIENT)
Age: 58
End: 2023-05-08

## 2023-09-05 ENCOUNTER — TEAM CONFERENCE (OUTPATIENT)
Dept: TRANSPLANT | Facility: CLINIC | Age: 58
End: 2023-09-05
Payer: COMMERCIAL

## 2023-09-05 NOTE — TELEPHONE ENCOUNTER
Image Review Meeting    ATTENDEES: Dr. Isaias Johnson & Lena Armas RN    IMAGES REVIEWED: CT chest/abd/pelvis w/o contrast 6/19/23 in PACS    DECISION: Images need to be reviewed at complex imaging       Targets for Kidney, smoker & on HD     IMPRESSION:     Apparent splenic hematoma. Intermediate density fluid along the splenic and hepatic margins and in the pelvis which may represent hemorrhage. Superimposed dialysate may be present.     Apparent right ovarian cyst.     Left upper lobe atelectasis.     INCIDENTALS: No

## 2023-10-13 ENCOUNTER — TEAM CONFERENCE (OUTPATIENT)
Dept: TRANSPLANT | Facility: CLINIC | Age: 58
End: 2023-10-13
Payer: COMMERCIAL

## 2023-10-13 NOTE — TELEPHONE ENCOUNTER
Complex Imaging Review    Lizeth Goetz MRN [id number] (Xuan/Finger)   Of note on initial evaluation committee was concerned with significant calcifications on previous imaging, requested imaging be completed before moving forward with the rest of her kidney transplant work up.  Just reviewed her chart and noted that she had a recent chest/abd/pelvis CT on 6/19/23 during a hospitalization.  Targets for Kidney, smoker & on PD  CT chest/abd/pelvis 6/19/23 w/o contrast in PACS  IMPRESSION:  Apparent splenic hematoma. Intermediate density fluid along the splenic and hepatic margins and in the pelvis which may represent hemorrhage. Superimposed dialysate may be present.  Apparent right ovarian cyst.  Left upper lobe atelectasis.  -Surgeon to review at time but may have space on left. Needs to be counseled that surgery may end without placing kidney if vessels not suitable.  -Yearly imaging updates  -smoking cessation mandatory for transplant given severe vascular disease  -A1/A2 titers reviewed. Note A1 IGM 1:32, others within criteria. Should repeat titers and make sure they are stable/review with Isael to see if she can be listed for A2 to B and cut down on waiting time. Unlikely to get transplanted if she needs the full ABO B waiting time.  -should strongly pursue live donors

## 2024-07-14 ENCOUNTER — HEALTH MAINTENANCE LETTER (OUTPATIENT)
Age: 59
End: 2024-07-14

## 2025-07-19 ENCOUNTER — HEALTH MAINTENANCE LETTER (OUTPATIENT)
Age: 60
End: 2025-07-19

## 2025-08-25 ENCOUNTER — POST MORTEM DOCUMENTATION (OUTPATIENT)
Dept: TRANSPLANT | Facility: CLINIC | Age: 60
End: 2025-08-25
Payer: COMMERCIAL

## 2025-08-25 DIAGNOSIS — B18.2 CHRONIC HEPATITIS C WITHOUT HEPATIC COMA (H): ICD-10-CM
